# Patient Record
Sex: FEMALE | Race: WHITE
[De-identification: names, ages, dates, MRNs, and addresses within clinical notes are randomized per-mention and may not be internally consistent; named-entity substitution may affect disease eponyms.]

---

## 2018-02-08 ENCOUNTER — HOSPITAL ENCOUNTER (EMERGENCY)
Dept: HOSPITAL 25 - UCCORT | Age: 28
Discharge: LEFT BEFORE DISCHARGE | End: 2018-02-08
Payer: SELF-PAY

## 2018-02-08 DIAGNOSIS — H92.02: Primary | ICD-10-CM

## 2018-02-08 DIAGNOSIS — Z53.21: ICD-10-CM

## 2018-02-09 ENCOUNTER — HOSPITAL ENCOUNTER (EMERGENCY)
Dept: HOSPITAL 25 - UCCORT | Age: 28
Discharge: HOME | End: 2018-02-09
Payer: SELF-PAY

## 2018-02-09 VITALS — SYSTOLIC BLOOD PRESSURE: 103 MMHG | DIASTOLIC BLOOD PRESSURE: 68 MMHG

## 2018-02-09 DIAGNOSIS — H60.92: Primary | ICD-10-CM

## 2018-02-09 DIAGNOSIS — F17.210: ICD-10-CM

## 2018-02-09 PROCEDURE — 87651 STREP A DNA AMP PROBE: CPT

## 2018-02-09 PROCEDURE — G0463 HOSPITAL OUTPT CLINIC VISIT: HCPCS

## 2018-02-09 PROCEDURE — 99212 OFFICE O/P EST SF 10 MIN: CPT

## 2018-02-09 NOTE — UC
Ear Complaint HPI





- HPI Summary


HPI Summary: 





28 yo female c/o L ear pain, progressiely worse last couple days.  No fever.  + 

sore throat.  no cough.  Pain worse at night.  No rash.  Uses q-tips, has noted 

yellowish drainage on q-tip, also in the morning upon awakening.  Pain radiates 

to jaw.  





- History of Current Complaint


Chief Complaint: UCEar


Stated Complaint: EAR COMPLAINT


Time Seen by Provider: 02/09/18 10:37


Hx Obtained From: Patient


Hx Last Menstrual Period: unknown, depo


Pain Intensity: 9





- Allergies/Home Medications


Allergies/Adverse Reactions: 


 Allergies











Allergy/AdvReac Type Severity Reaction Status Date / Time


 


No Known Allergies Allergy   Verified 02/09/18 09:54














PMH/Surg Hx/FS Hx/Imm Hx


Previously Healthy: Yes





- Surgical History


Surgical History: Yes


Surgery Procedure, Year, and Place: c-sections x3, breast surgery





- Family History


Known Family History: 


   Negative: Cardiac Disease, Hypertension, Diabetes





- Social History


Alcohol Use: None


Substance Use Type: None


Smoking Status (MU): Light Every Day Tobacco Smoker


Type: Cigarettes


Amount Used/How Often: 1/2 pack q day


Have You Smoked in the Last Year: Yes





Review of Systems


Constitutional: Fatigue


Skin: Negative


Eyes: Negative


ENT: Other - see hpi


Respiratory: Negative


Cardiovascular: Negative


Gastrointestinal: Negative


Genitourinary: Negative


Motor: Negative


Neurovascular: Negative


Musculoskeletal: Negative


Neurological: Negative


Psychological: Negative


Is Patient Immunocompromised?: No


All Other Systems Reviewed And Are Negative: Yes





Physical Exam


Triage Information Reviewed: Yes


Appearance: Thin, Other: - sitting up, conversing easily and appropriately.  

NAD.  Looks tired, nontoxic general.


Vital Signs: 


 Initial Vital Signs











Temp  98.6 F   02/09/18 09:50


 


Pulse  77   02/09/18 09:50


 


Resp  14   02/09/18 09:50


 


BP  103/68   02/09/18 09:50


 


Pulse Ox  100   02/09/18 09:50











Vital Signs Reviewed: Yes


Eye Exam: Normal - grossly normal.


ENT: Positive: Pharyngeal erythema - post pharyx erythematous, no john sores 

although pt noted that she noted a white spot yesterday, Other - R TM grey, rtx'

d.  Intact.  EAC ok.  L TM red and grey.  Bullous irritation ant inf TM.  + 

white yellow detritus.  I do not see john TM rupture, although suspicion is 

present.


Neck exam: Normal


Neck: Positive: Supple, Nontender, No Lymphadenopathy


Respiratory Exam: Normal


Respiratory: Positive: Chest non-tender, Lungs clear, Normal breath sounds, No 

respiratory distress


Cardiovascular Exam: Normal


Cardiovascular: Positive: RRR, No Murmur, Pulses Normal, Brisk Capillary Refill


Abdominal Exam: Normal


Musculoskeletal Exam: Normal - gait steady


Neurological Exam: Normal - grossly nonfocal


Psychological Exam: Normal - conversing easily and appropriately





Ear Complaint Course/Dx





- Course


Course Of Treatment: RST - negative.  Reviewed with pt coa / tx plan.  Reviewed 

need for f/u with pcp.  Questions as posed answered to the best of my ability.  

Rx - cortisporin opthalmic drops (not otic, d/t unclear if tm fully intact).  

Azithromycin.





- Differential Dx/Diagnosis


Provider Diagnoses: Otitis media with concern for possible bullous otitis.  

Otitis externa (likely related to q-tip use)





Discharge





- Discharge Plan


Condition: Stable


Disposition: HOME


Prescriptions: 


Azithromyxin NELLY (NF) [Z-Nelly (Zithromax) 250 mg tabs #6] 2 tab PO .TODAY, THEN 

1 DAILY #6 tab


Neomycin/Polym/HC OPTH.SUSP* [Cortisporin OPHTH.SUSP*] 2 drop LEFT EAR Q6H #1 

btl


Patient Education Materials:  Antihistamine (By mouth), Ibuprofen (By mouth), 

Otitis Externa (ED), Ear Infection (ED)


Referrals: 


PERRY Cole [Primary Care Provider] - 


Additional Instructions: 


Follow up with your primary care physician in the next 1-2 weeks for ear check.

  


Please try to avoid q-tips in the ear.  


Seek medical attention for worse or new problems. 





You have blisters on your eardrum (ant inferior region), this may be "bullous 

otitis media"

## 2018-04-06 ENCOUNTER — HOSPITAL ENCOUNTER (EMERGENCY)
Dept: HOSPITAL 25 - UCCORT | Age: 28
Discharge: HOME | End: 2018-04-06
Payer: SELF-PAY

## 2018-04-06 VITALS — SYSTOLIC BLOOD PRESSURE: 113 MMHG | DIASTOLIC BLOOD PRESSURE: 58 MMHG

## 2018-04-06 DIAGNOSIS — K04.7: Primary | ICD-10-CM

## 2018-04-06 DIAGNOSIS — N94.6: ICD-10-CM

## 2018-04-06 DIAGNOSIS — F17.210: ICD-10-CM

## 2018-04-06 PROCEDURE — G0463 HOSPITAL OUTPT CLINIC VISIT: HCPCS

## 2018-04-06 PROCEDURE — 84702 CHORIONIC GONADOTROPIN TEST: CPT

## 2018-04-06 PROCEDURE — 81003 URINALYSIS AUTO W/O SCOPE: CPT

## 2018-04-06 PROCEDURE — 99212 OFFICE O/P EST SF 10 MIN: CPT

## 2018-04-06 PROCEDURE — 76830 TRANSVAGINAL US NON-OB: CPT

## 2018-04-06 NOTE — RAD
Indication: Pelvic pain.



Real-time sonography of the pelvis was performed utilizing endovaginal technique.



The uterus measures 8.2 x 3.5 x 4.6 cm. Endometrial echo measures 3 mm.



Right ovary measures 3.3 x 1.9 x 2.3 cm. Left ovary measures 2.8 x 2.1 x 2.5 cm. No

adnexal masses are noted. Doppler interrogation demonstrates flow in both ovaries.



IMPRESSION: Unremarkable pelvic ultrasound.

## 2018-04-06 NOTE — UC
Dental HPI





- HPI Summary


HPI Summary: 





Pt presents with ?dental abscess left upper tooth. She was seen at [facility] 

and told that she needs three teeth pulled, but needed to take some Amoxicillin 

first due to potential infect. She has been taking this for 3 days, but woke up 

this morning with increased pain on the upper left side of her mouth. 





She also mentions that she has had some lower abdominal cramping that started 2 

days ago with her menstrual period. She says that she has been on birth control 

for years, but recently switched to a new OBC and this is the first time she 

has had her period many months. Has a history of ovarian cysts. Today her 

pelvic pain is improved and much more tolerable.





Denies fever, chills, SOB, chest pain, dysuria, vaginal discharge, pain with 

intercourse, n/v/d/c.  











- History of Current Complaint


Hx Obtained From: Patient


Hx Last Menstrual Period: unknown, depo


Onset/Duration: Gradual Onset


Severity: Moderate


Pain Intensity: 6


Pain Scale Used: 0-10 Numeric





<Dexter Williamson - Last Filed: 04/06/18 14:36>





<Kristy Walker - Last Filed: 04/07/18 20:19>





- History of Current Complaint


Stated Complaint: TOOTHACHE


Time Seen by Provider: 04/06/18 10:34





- Allergies/Home Medications


Allergies/Adverse Reactions: 


 Allergies











Allergy/AdvReac Type Severity Reaction Status Date / Time


 


azithromycin [From Zithromax] AdvReac  Yeast Verified 04/06/18 10:47





   Infection  











Home Medications: 


 Home Medications





Amoxicillin PO (*) [Amoxicillin 500 MG CAP*] 500 mg PO Q8HR 04/06/18 [History 

Confirmed 04/06/18]


Naproxen Sodium [Naproxen Sodium  MG TAB] 500 mg PO BID PRN 04/06/18 [

History Confirmed 04/06/18]


Norethindrone [Lyza] 1 tab PO DAILY 04/06/18 [History Confirmed 04/06/18]











PMH/Surg Hx/FS Hx/Imm Hx


Previously Healthy: Yes





- Surgical History


Surgical History: Yes


Surgery Procedure, Year, and Place: c-sections x3, breast surgery





- Family History


Known Family History: 


   Negative: Cardiac Disease, Hypertension, Diabetes





- Social History


Lives: With Family


Alcohol Use: None


Substance Use Type: None


Smoking Status (MU): Light Every Day Tobacco Smoker


Type: Cigarettes


Amount Used/How Often: 1/2 pack q day


Have You Smoked in the Last Year: Yes





<Dexter Williamson - Last Filed: 04/06/18 14:36>





Review of Systems


Constitutional: Negative


Skin: Negative


Eyes: Negative


ENT: Dental Pain


Respiratory: Negative


Cardiovascular: Negative


Gastrointestinal: Negative


Genitourinary: Other - Pelvic pain


Neurovascular: Negative


Musculoskeletal: Negative


Neurological: Negative


Psychological: Negative


All Other Systems Reviewed And Are Negative: Yes





<Dexter Williamson - Last Filed: 04/06/18 14:36>





Physical Exam


Triage Information Reviewed: Yes


Appearance: Well-Appearing, No Pain Distress, Well-Nourished


Vital Signs Reviewed: Yes


ENT: Positive: Pharynx normal, TMs normal, Dental tenderness - Tooth #2-3, 

Uvula midline.  Negative: Pharyngeal erythema, TM bulging, TM dull, TM red, 

Tonsillar swelling, Tonsillar exudate, Hoarse voice


Dental: Positive: Percussion Tenderness @ - Tooth #2-3, Gross Decay/Caries @ - 

Throughout, Abscess @ - Tooth #2-3.  Negative: Cervical Lymphadenopathy, 

Bleeding


Neck: Positive: Supple, Nontender, No Lymphadenopathy


Respiratory: Positive: Lungs clear, Normal breath sounds, No respiratory 

distress, No accessory muscle use


Cardiovascular: Positive: RRR, No Murmur, Pulses Normal


Abdomen Description: Positive: No Organomegaly, Soft, Other: - Mild TTP 

suprapubic.  Negative: CVA Tenderness (R), CVA Tenderness (L), Distended, 

Guarding, McBurney's Point Tenderness


Bowel Sounds: Positive: Present


Neurological: Positive: Alert


Psychological: Positive: Age Appropriate Behavior


Skin: Negative: rashes, significant lesion(s)





- Additional Comments





Refused pelvic exam - currently menstruating. 





<Dexter Williamson - Last Filed: 04/06/18 14:36>


Vital Signs: 


 Initial Vital Signs











Temp  98.7 F   04/06/18 10:49


 


Pulse  73   04/06/18 10:49


 


Resp  16   04/06/18 10:49


 


BP  113/58   04/06/18 10:49


 


Pulse Ox  96   04/06/18 10:49














<Kristy Walker - Last Filed: 04/07/18 20:19>





Dental Complaint Course/Dx





- Course


Course Of Treatment: US: IMPRESSION: Unremarkable pelvic ultrasound.  Will 

change her amoxicillin to clindamycin and have her f/u with her dentist. Her UA

, pregnancy, and US were negative. I will refer her to OBGYN for further 

evaluation for, what I suspect is, menstrual pain





- Differential Dx/Diagnosis


Provider Diagnoses: Dental abscess.  menstrual pain





<Prosper Williamsonothy - Last Filed: 04/06/18 14:36>





Discharge





- Sign-Out/Discharge


Documenting (check all that apply): Discharge





- Billing Disposition and Condition


Condition: STABLE


Disposition: HOME





<Dexter Williamson - Last Filed: 04/06/18 14:36>





- Sign-Out/Discharge


Documenting (check all that apply): Discharge





- Billing Disposition and Condition


Condition: STABLE


Disposition: HOME





<Kristy Walker - Last Filed: 04/07/18 20:19>





- Discharge Plan


Condition: Stable


Disposition: HOME


Prescriptions: 


Clindamycin HCl 300 mg PO TID #21 capsule


Magic Mouth Was-JUAN/MAAL/LIDO* 5 ml SWISH SPIT TID #100 ml


Patient Education Materials:  Dental Abscess (ED)


Referrals: 


PERRY Cole [Primary Care Provider] - 


Sher Hendrix MD [Medical Doctor] - As Soon As Possible


Additional Instructions: 


If you develop a fever, shortness of breath, chest pain, new or worsening 

symptoms - please call your PCP or go to the ED.


 


1) Please schedule a follow up appointment with your dentist and with OBGYN at 

the number below for further evaluation of your menstrual pain.





Attestation Statement


User Type: Provider - I was available for consult. This patient was seen by the 

BLANCO. The patient was not presented to, seen by, or examined by me. -Luis Antonioj





<Kristy Walker - Last Filed: 04/07/18 20:19>